# Patient Record
Sex: MALE | Race: WHITE | NOT HISPANIC OR LATINO | URBAN - METROPOLITAN AREA
[De-identification: names, ages, dates, MRNs, and addresses within clinical notes are randomized per-mention and may not be internally consistent; named-entity substitution may affect disease eponyms.]

---

## 2021-09-28 ENCOUNTER — OFFICE (OUTPATIENT)
Dept: URBAN - METROPOLITAN AREA CLINIC 11 | Facility: CLINIC | Age: 61
End: 2021-09-28

## 2021-09-28 VITALS
DIASTOLIC BLOOD PRESSURE: 76 MMHG | HEART RATE: 44 BPM | HEIGHT: 73 IN | SYSTOLIC BLOOD PRESSURE: 131 MMHG | OXYGEN SATURATION: 100 % | WEIGHT: 196 LBS

## 2021-09-28 DIAGNOSIS — R19.5 OTHER FECAL ABNORMALITIES: ICD-10-CM

## 2021-09-28 PROCEDURE — 99204 OFFICE O/P NEW MOD 45 MIN: CPT

## 2021-09-28 RX ORDER — SODIUM SULFATE, MAGNESIUM SULFATE, AND POTASSIUM CHLORIDE 17.75; 2.7; 2.25 G/1; G/1; G/1
TABLET ORAL
Qty: 1 | Refills: 0 | Status: COMPLETED
Start: 2021-09-28 | End: 2021-11-03

## 2021-09-28 NOTE — SERVICENOTES
Heme-positive stool, needs further evaluation with a colonoscopy.  I discussed the procedure as well as risk versus benefit, risks including bleeding, perforation, splenic injury, aspiration, complication from sedation and he is agreeable to proceed.

## 2021-09-28 NOTE — SERVICEHPINOTES
60 y/o male referred by Dr. Cerrato for heme positive stool. Lab 8/30-Hgb 13.4, Hct 40.4. He denies hematochezia or melena.  He has no GI complaints, and specifically denies GERD, dysphagia, abdominal pain, diarrhea or constipation. He states that he moves his bowels regularly typically every morning at 5:30 a.m. and sometimes after lunch.  He has no family history of colon cancer or polyps.  He is in excellent health for his age in runs 3-5 miles every morning.  He will be running a [employer] again this year.He had a 10 mm hyperplastic polyp removed during colonoscopy in 2014. He had an incomplete colonoscopy in 2006 due to sigmoid colonic looping and subsequently had a barium enema demonstrating scattered diverticula in the sigmoid colon otherwise normal.

## 2021-11-03 ENCOUNTER — AMBULATORY SURGICAL CENTER (OUTPATIENT)
Dept: URBAN - METROPOLITAN AREA SURGERY 3 | Facility: SURGERY | Age: 61
End: 2021-11-03

## 2021-11-03 VITALS
WEIGHT: 190 LBS | DIASTOLIC BLOOD PRESSURE: 57 MMHG | SYSTOLIC BLOOD PRESSURE: 113 MMHG | SYSTOLIC BLOOD PRESSURE: 137 MMHG | HEART RATE: 48 BPM | HEART RATE: 40 BPM | HEART RATE: 44 BPM | RESPIRATION RATE: 16 BRPM | OXYGEN SATURATION: 98 % | SYSTOLIC BLOOD PRESSURE: 113 MMHG | TEMPERATURE: 97 F | HEIGHT: 73 IN | RESPIRATION RATE: 18 BRPM | OXYGEN SATURATION: 99 % | SYSTOLIC BLOOD PRESSURE: 109 MMHG | OXYGEN SATURATION: 97 % | OXYGEN SATURATION: 98 % | OXYGEN SATURATION: 97 % | HEART RATE: 44 BPM | TEMPERATURE: 97 F | RESPIRATION RATE: 18 BRPM | TEMPERATURE: 97 F | HEART RATE: 40 BPM | TEMPERATURE: 97.8 F | SYSTOLIC BLOOD PRESSURE: 113 MMHG | SYSTOLIC BLOOD PRESSURE: 137 MMHG | HEIGHT: 73 IN | HEART RATE: 40 BPM | RESPIRATION RATE: 15 BRPM | DIASTOLIC BLOOD PRESSURE: 57 MMHG | TEMPERATURE: 97.8 F | DIASTOLIC BLOOD PRESSURE: 79 MMHG | SYSTOLIC BLOOD PRESSURE: 143 MMHG | TEMPERATURE: 97.8 F | OXYGEN SATURATION: 99 % | DIASTOLIC BLOOD PRESSURE: 79 MMHG | DIASTOLIC BLOOD PRESSURE: 60 MMHG | OXYGEN SATURATION: 99 % | HEART RATE: 48 BPM | DIASTOLIC BLOOD PRESSURE: 60 MMHG | OXYGEN SATURATION: 98 % | HEART RATE: 38 BPM | HEIGHT: 73 IN | SYSTOLIC BLOOD PRESSURE: 143 MMHG | SYSTOLIC BLOOD PRESSURE: 116 MMHG | RESPIRATION RATE: 15 BRPM | SYSTOLIC BLOOD PRESSURE: 143 MMHG | WEIGHT: 190 LBS | RESPIRATION RATE: 15 BRPM | RESPIRATION RATE: 20 BRPM | SYSTOLIC BLOOD PRESSURE: 109 MMHG | OXYGEN SATURATION: 97 % | HEART RATE: 38 BPM | HEART RATE: 48 BPM | SYSTOLIC BLOOD PRESSURE: 116 MMHG | HEART RATE: 38 BPM | SYSTOLIC BLOOD PRESSURE: 116 MMHG | HEART RATE: 44 BPM | RESPIRATION RATE: 18 BRPM | DIASTOLIC BLOOD PRESSURE: 79 MMHG | RESPIRATION RATE: 16 BRPM | SYSTOLIC BLOOD PRESSURE: 137 MMHG | DIASTOLIC BLOOD PRESSURE: 60 MMHG | DIASTOLIC BLOOD PRESSURE: 57 MMHG | RESPIRATION RATE: 20 BRPM | RESPIRATION RATE: 16 BRPM | SYSTOLIC BLOOD PRESSURE: 109 MMHG | WEIGHT: 190 LBS | RESPIRATION RATE: 20 BRPM

## 2021-11-03 DIAGNOSIS — K57.30 DIVERTICULOSIS OF LARGE INTESTINE WITHOUT PERFORATION OR ABS: ICD-10-CM

## 2021-11-03 DIAGNOSIS — R19.5 OTHER FECAL ABNORMALITIES: ICD-10-CM

## 2021-11-03 PROCEDURE — 45378 DIAGNOSTIC COLONOSCOPY: CPT | Performed by: INTERNAL MEDICINE

## 2021-11-03 NOTE — SERVICEHPINOTES
62 y/o male referred by Dr. Cerrato for heme positive stool. Lab 8/30-Hgb 13.4, Hct 40.4. He denies hematochezia or melena.  He has no GI complaints, and specifically denies GERD, dysphagia, abdominal pain, diarrhea or constipation. He states that he moves his bowels regularly typically every morning at 5:30 a.m. and sometimes after lunch.  He has no family history of colon cancer or polyps.  He is in excellent health for his age in runs 3-5 miles every morning.  He will be running a [employer] again this year.He had a 10 mm hyperplastic polyp removed during colonoscopy in 2014. He had an incomplete colonoscopy in 2006 due to sigmoid colonic looping and subsequently had a barium enema demonstrating scattered diverticula in the sigmoid colon otherwise normal.

## 2021-11-03 NOTE — SERVICEHPINOTES
60 y/o male referred by Dr. Cerrato for heme positive stool. Lab 8/30-Hgb 13.4, Hct 40.4. He denies hematochezia or melena.  He has no GI complaints, and specifically denies GERD, dysphagia, abdominal pain, diarrhea or constipation. He states that he moves his bowels regularly typically every morning at 5:30 a.m. and sometimes after lunch.  He has no family history of colon cancer or polyps.  He is in excellent health for his age in runs 3-5 miles every morning.  He will be running a [employer] again this year.He had a 10 mm hyperplastic polyp removed during colonoscopy in 2014. He had an incomplete colonoscopy in 2006 due to sigmoid colonic looping and subsequently had a barium enema demonstrating scattered diverticula in the sigmoid colon otherwise normal.

## 2021-11-03 NOTE — SERVICEHPINOTES
60 y/o male referred by Dr. Cerrato for heme positive stool. Lab 8/30-Hgb 13.4, Hct 40.4. He denies hematochezia or melena.  He has no GI complaints, and specifically denies GERD, dysphagia, abdominal pain, diarrhea or constipation. He states that he moves his bowels regularly typically every morning at 5:30 a.m. and sometimes after lunch.  He has no family history of colon cancer or polyps.  He is in excellent health for his age in runs 3-5 miles every morning.  He will be running a [employer] again this year.He had a 10 mm hyperplastic polyp removed during colonoscopy in 2014. He had an incomplete colonoscopy in 2006 due to sigmoid colonic looping and subsequently had a barium enema demonstrating scattered diverticula in the sigmoid colon otherwise normal.

## 2021-12-26 PROBLEM — R19.5 PRESENCE OF FECAL OCCULT BLOOD: Status: ACTIVE | Noted: 2021-11-03
